# Patient Record
Sex: MALE | Race: WHITE | NOT HISPANIC OR LATINO | Employment: FULL TIME | RURAL
[De-identification: names, ages, dates, MRNs, and addresses within clinical notes are randomized per-mention and may not be internally consistent; named-entity substitution may affect disease eponyms.]

---

## 2022-03-07 ENCOUNTER — HOSPITAL ENCOUNTER (OUTPATIENT)
Dept: RADIOLOGY | Facility: HOSPITAL | Age: 27
Discharge: HOME | End: 2022-03-07
Attending: NURSE PRACTITIONER

## 2022-03-07 DIAGNOSIS — M25.559 HIP PAIN: ICD-10-CM

## 2022-03-07 DIAGNOSIS — M79.605 PAIN OF LEFT LOWER EXTREMITY: ICD-10-CM

## 2022-03-07 DIAGNOSIS — M79.602 PAIN OF LEFT UPPER EXTREMITY: ICD-10-CM

## 2022-03-07 DIAGNOSIS — M25.559 HIP PAIN: Primary | ICD-10-CM

## 2022-03-07 PROCEDURE — 76882 US LMTD JT/FCL EVL NVASC XTR: CPT | Mod: TC,LT

## 2022-03-07 PROCEDURE — 73502 X-RAY EXAM HIP UNI 2-3 VIEWS: CPT | Mod: TC,LT

## 2022-03-07 PROCEDURE — 73552 X-RAY EXAM OF FEMUR 2/>: CPT | Mod: TC,LT

## 2022-04-07 PROBLEM — R60.9 ACCUMULATION OF FLUID IN TISSUES: Status: ACTIVE | Noted: 2022-04-07

## 2022-05-18 PROBLEM — S81.802A LEG WOUND, LEFT: Status: ACTIVE | Noted: 2022-05-18

## 2023-03-07 ENCOUNTER — HOSPITAL ENCOUNTER (EMERGENCY)
Facility: HOSPITAL | Age: 28
Discharge: HOME | End: 2023-03-07
Attending: EMERGENCY MEDICINE

## 2023-03-07 VITALS
SYSTOLIC BLOOD PRESSURE: 173 MMHG | TEMPERATURE: 98 F | DIASTOLIC BLOOD PRESSURE: 91 MMHG | HEART RATE: 86 BPM | RESPIRATION RATE: 20 BRPM | BODY MASS INDEX: 30.48 KG/M2 | OXYGEN SATURATION: 100 % | WEIGHT: 230 LBS | HEIGHT: 73 IN

## 2023-03-07 DIAGNOSIS — H16.9 KERATITIS: Primary | ICD-10-CM

## 2023-03-07 PROCEDURE — 99283 PR EMERGENCY DEPT VISIT,LEVEL III: ICD-10-PCS | Mod: ,,, | Performed by: EMERGENCY MEDICINE

## 2023-03-07 PROCEDURE — 99283 EMERGENCY DEPT VISIT LOW MDM: CPT

## 2023-03-07 PROCEDURE — 99283 EMERGENCY DEPT VISIT LOW MDM: CPT | Mod: ,,, | Performed by: EMERGENCY MEDICINE

## 2023-03-07 PROCEDURE — 25000003 PHARM REV CODE 250: Performed by: EMERGENCY MEDICINE

## 2023-03-07 RX ORDER — ERYTHROMYCIN 5 MG/G
OINTMENT OPHTHALMIC
Status: COMPLETED | OUTPATIENT
Start: 2023-03-07 | End: 2023-03-07

## 2023-03-07 RX ORDER — TETRACAINE HYDROCHLORIDE 5 MG/ML
2 SOLUTION OPHTHALMIC
Status: COMPLETED | OUTPATIENT
Start: 2023-03-07 | End: 2023-03-07

## 2023-03-07 RX ADMIN — TETRACAINE HYDROCHLORIDE 2 DROP: 5 SOLUTION OPHTHALMIC at 12:03

## 2023-03-07 RX ADMIN — ERYTHROMYCIN: 5 OINTMENT OPHTHALMIC at 12:03

## 2023-03-07 RX ADMIN — FLUORESCEIN SODIUM 1 EACH: 1 STRIP OPHTHALMIC at 12:03

## 2023-03-07 NOTE — ED TRIAGE NOTES
Pt c/o bilateral eye pain rolanda started suddenly 15 mins PTA. Pt states he did weld yesterday but used appropriate face shield.

## 2023-03-07 NOTE — ED PROVIDER NOTES
"Encounter Date: 3/7/2023       History     Chief Complaint   Patient presents with    Eye Pain     bilateral     26 Y/O MALE  WITH ACUTE ONSET OF PAIN IN BOTH EYES THIS EVENING.  HE WAS WELDING EARLIER IN THE DAY.  HE HAS PHOTOPHOBIA.  PAIN IS SEVERE, AND PAIN IS WORSE WITH LIGHT.      Review of patient's allergies indicates:   Allergen Reactions    Opioids - morphine analogues Other (See Comments)     Pt states makes body feel like its "burning on the inside"     History reviewed. No pertinent past medical history.  Past Surgical History:   Procedure Laterality Date    ADENOIDECTOMY      DEBRIDEMENT Left 4/7/2022    Procedure: DEBRIDEMENT;  Surgeon: Tomi Campbell MD;  Location: [facility];  Service: General;  Laterality: Left;    INCISION AND DRAINAGE Left 4/7/2022    Procedure: INCISION AND DRAINAGE, HEMATOMA, SEROMA, OR FLUID COLLECTION;  Surgeon: Tomi Campbell MD;  Location: [facility] OR;  Service: General;  Laterality: Left;    TONSILLECTOMY      WOUND DEBRIDEMENT Left 4/22/2022    Procedure: DEBRIDEMENT, WOUND;  Surgeon: Tomi Campbell MD;  Location: [facility] OR;  Service: General;  Laterality: Left;    WOUND DEBRIDEMENT  4/22/2022    Procedure: ;  Surgeon: Tomi Campbell MD;  Location: [facility];  Service: General;;     History reviewed. No pertinent family history.  Social History     Tobacco Use    Smoking status: Every Day     Packs/day: 0.50     Types: Cigarettes    Smokeless tobacco: Never    Tobacco comments:     Vape   Substance Use Topics    Alcohol use: Not Currently    Drug use: Never     Review of Systems   All other systems reviewed and are negative.    Physical Exam     Initial Vitals [03/07/23 0006]   BP Pulse Resp Temp SpO2   (!) 173/91 86 20 97.9 °F (36.6 °C) 100 %      MAP       --         Physical Exam    Nursing note and vitals reviewed.  Constitutional: He appears well-developed and well-nourished.   HENT:   Head: Normocephalic and atraumatic.   Nose: Nose normal.   Mouth/Throat: " Oropharynx is clear and moist.   Eyes: Conjunctivae and EOM are normal. Pupils are equal, round, and reactive to light.   Neck: Neck supple.   Normal range of motion.  Cardiovascular:  Normal rate, regular rhythm and normal heart sounds.           Pulmonary/Chest: Breath sounds normal.   Abdominal: Abdomen is soft. Bowel sounds are normal.   Musculoskeletal:         General: Normal range of motion.      Cervical back: Normal range of motion and neck supple.     Neurological: He is alert and oriented to person, place, and time. He has normal strength. GCS score is 15. GCS eye subscore is 4. GCS verbal subscore is 5. GCS motor subscore is 6.   Skin: Skin is warm and dry. Capillary refill takes less than 2 seconds.   Psychiatric: He has a normal mood and affect.       Medical Screening Exam   See Full Note    ED Course   Procedures  Labs Reviewed - No data to display       Imaging Results    None          Medications   erythromycin 5 mg/gram (0.5 %) ophthalmic ointment ( Both Eyes Given 3/7/23 0021)   TETRAcaine HCl (PF) 0.5 % Drop 2 drop (2 drops Both Eyes Given 3/7/23 0019)   fluorescein ophthalmic strip 1 each (1 each Both Eyes Given 3/7/23 0019)                       Clinical Impression:   Final diagnoses:  [H16.9] Keratitis (Primary)        ED Disposition Condition    Discharge Stable          ED Prescriptions    None       Follow-up Information    None          Dariusz Hernández MD  03/07/23 0025

## 2023-03-07 NOTE — DISCHARGE INSTRUCTIONS
USE ANTIBIOTIC DROPS TWICE DAILY.  FOLLOW UP WITH EYE DOCTOR IF SYMPTOMS PERSIST OR WORSEN OR OTHERWISE AS NEEDED.

## 2023-03-07 NOTE — Clinical Note
"Berry"Silverio Zhao was seen and treated in our emergency department on 3/7/2023.  He may return to work on 03/10/2023.       If you have any questions or concerns, please don't hesitate to call.      Dariusz Hernández MD"

## 2023-03-13 NOTE — ADDENDUM NOTE
Encounter addended by: Isabella Carey on: 3/13/2023 11:29 AM   Actions taken: SmartForm saved, Flowsheet accepted

## 2024-01-22 ENCOUNTER — LAB VISIT (OUTPATIENT)
Dept: PRIMARY CARE CLINIC | Facility: CLINIC | Age: 29
End: 2024-01-22

## 2024-01-22 DIAGNOSIS — Z02.83 ENCOUNTER FOR DRUG SCREENING: Primary | ICD-10-CM

## 2024-01-22 PROCEDURE — 99000 SPECIMEN HANDLING OFFICE-LAB: CPT | Mod: ,,, | Performed by: NURSE PRACTITIONER

## 2024-01-22 PROCEDURE — 82075 ASSAY OF BREATH ETHANOL: CPT | Mod: ,,, | Performed by: NURSE PRACTITIONER

## 2024-01-23 NOTE — PROGRESS NOTES
Subjective     Patient ID: Berry Zhao is a 28 y.o. male.    Chief Complaint: No chief complaint on file.    HPI  Review of Systems       Objective     Physical Exam       Assessment and Plan     1. Encounter for drug screening        Drug testing only           No follow-ups on file.

## 2024-07-29 ENCOUNTER — LAB VISIT (OUTPATIENT)
Dept: PRIMARY CARE CLINIC | Facility: CLINIC | Age: 29
End: 2024-07-29

## 2024-07-29 DIAGNOSIS — Z13.39 ALCOHOL SCREENING: ICD-10-CM

## 2024-07-29 DIAGNOSIS — Z02.83 ENCOUNTER FOR DRUG SCREENING: Primary | ICD-10-CM

## 2024-07-29 PROCEDURE — 99000 SPECIMEN HANDLING OFFICE-LAB: CPT | Mod: ,,, | Performed by: NURSE PRACTITIONER

## 2024-07-29 PROCEDURE — 82075 ASSAY OF BREATH ETHANOL: CPT | Mod: ,,, | Performed by: NURSE PRACTITIONER
